# Patient Record
Sex: MALE | Race: BLACK OR AFRICAN AMERICAN | Employment: UNEMPLOYED | URBAN - METROPOLITAN AREA
[De-identification: names, ages, dates, MRNs, and addresses within clinical notes are randomized per-mention and may not be internally consistent; named-entity substitution may affect disease eponyms.]

---

## 2018-09-18 ENCOUNTER — HOSPITAL ENCOUNTER (EMERGENCY)
Age: 33
Discharge: HOME | End: 2018-09-18
Payer: SELF-PAY

## 2018-09-18 VITALS
HEART RATE: 98 BPM | WEIGHT: 180 LBS | HEIGHT: 73 IN | BODY MASS INDEX: 23.86 KG/M2 | SYSTOLIC BLOOD PRESSURE: 154 MMHG | OXYGEN SATURATION: 100 % | RESPIRATION RATE: 16 BRPM | TEMPERATURE: 97.7 F | DIASTOLIC BLOOD PRESSURE: 86 MMHG

## 2018-09-18 DIAGNOSIS — J06.9 VIRAL URI: Primary | ICD-10-CM

## 2018-09-18 LAB — DEPRECATED S PYO AG THROAT QL EIA: NEGATIVE

## 2018-09-18 PROCEDURE — 99283 EMERGENCY DEPT VISIT LOW MDM: CPT

## 2018-09-18 PROCEDURE — 87880 STREP A ASSAY W/OPTIC: CPT

## 2018-09-18 PROCEDURE — 87081 CULTURE SCREEN ONLY: CPT

## 2018-09-18 RX ORDER — PREDNISONE 10 MG/1
TABLET ORAL
Qty: 21 TAB | Refills: 0 | Status: SHIPPED | OUTPATIENT
Start: 2018-09-18

## 2018-09-18 NOTE — ED PROVIDER NOTES
HPI Comments: 72-year-old male complaining of sore throat and dry tongue. His symptoms started this morning about 3 hours ago. Patient ate breakfast without difficulty and came to the ED to be checked. Patient is a 34 y.o. male presenting with sore throat. The history is provided by the patient. Sore Throat This is a new problem. There has been no fever. Pertinent negatives include no diarrhea, no vomiting and no congestion. He has tried nothing for the symptoms. History reviewed. No pertinent past medical history. History reviewed. No pertinent surgical history. History reviewed. No pertinent family history. Social History Social History  Marital status: SINGLE Spouse name: N/A  
 Number of children: N/A  
 Years of education: N/A Occupational History  Not on file. Social History Main Topics  Smoking status: Not on file  Smokeless tobacco: Not on file  Alcohol use Not on file  Drug use: Not on file  Sexual activity: Not on file Other Topics Concern  Not on file Social History Narrative  No narrative on file ALLERGIES: Review of patient's allergies indicates no known allergies. Review of Systems Constitutional: Negative. Negative for activity change. HENT: Positive for sore throat. Negative for congestion. Eyes: Negative. Respiratory: Negative. Cardiovascular: Negative. Gastrointestinal: Negative. Negative for diarrhea and vomiting. Genitourinary: Negative. Musculoskeletal: Negative. Skin: Negative. Neurological: Negative. Psychiatric/Behavioral: Negative. All other systems reviewed and are negative. Vitals:  
 09/18/18 0759 BP: (!) 148/91 Pulse: (!) 106 Resp: 16 Temp: 97.7 °F (36.5 °C) SpO2: 96% Weight: 81.6 kg (180 lb) Height: 6' 1\" (1.854 m) Physical Exam  
Constitutional: He is oriented to person, place, and time.  He appears well-developed and well-nourished. No distress. HENT:  
Head: Normocephalic and atraumatic. Right Ear: External ear normal.  
Left Ear: External ear normal.  
Nose: Nose normal.  
Mouth/Throat: Oropharynx is clear and moist. No oropharyngeal exudate. Eyes: Conjunctivae and EOM are normal. Pupils are equal, round, and reactive to light. Right eye exhibits no discharge. Left eye exhibits no discharge. No scleral icterus. Neck: Normal range of motion. Neck supple. No JVD present. No tracheal deviation present. Cardiovascular: Regular rhythm and intact distal pulses. Tachycardia present. Pulmonary/Chest: Effort normal and breath sounds normal. No stridor. No respiratory distress. He has no wheezes. He exhibits no tenderness. Abdominal: Soft. Bowel sounds are normal. He exhibits no distension and no mass. There is no tenderness. Musculoskeletal: Normal range of motion. He exhibits no edema or tenderness. Neurological: He is alert and oriented to person, place, and time. No cranial nerve deficit. Skin: Skin is warm and dry. No rash noted. He is not diaphoretic. No erythema. No pallor. Psychiatric: He has a normal mood and affect. His behavior is normal. Thought content normal.  
Nursing note and vitals reviewed. MDM Number of Diagnoses or Management Options Diagnosis management comments: Assessment viral illness Plan prednisone followed closely PCP Amount and/or Complexity of Data Reviewed Clinical lab tests: ordered and reviewed Tests in the medicine section of CPT®: ordered and reviewed ED Course Procedures

## 2018-09-18 NOTE — DISCHARGE INSTRUCTIONS

## 2018-09-18 NOTE — ED NOTES
I have reviewed discharge instructions with the patient. The patient verbalized understanding. Patient left ED via Discharge Method: ambulatory to Home with girlfriend. Opportunity for questions and clarification provided. Patient given 1 scripts. Prednisone. Instructed to take OTC tylenol/motrin for fever/pain and to push fluids. To continue your aftercare when you leave the hospital, you may receive an automated call from our care team to check in on how you are doing. This is a free service and part of our promise to provide the best care and service to meet your aftercare needs.  If you have questions, or wish to unsubscribe from this service please call [phone]. Thank you for Choosing our [facility] Emergency Department.

## 2018-09-18 NOTE — ED TRIAGE NOTES
Patient c/o dry mouth and sore throat since this morning. Denies cough, congestion, sob, abdominal pain.

## 2018-09-21 LAB
BACTERIA SPEC CULT: NORMAL
SERVICE CMNT-IMP: NORMAL

## 2018-10-02 ENCOUNTER — HOSPITAL ENCOUNTER (EMERGENCY)
Age: 33
Discharge: HOME | End: 2018-10-02
Attending: EMERGENCY MEDICINE
Payer: SELF-PAY

## 2018-10-02 ENCOUNTER — APPOINTMENT (OUTPATIENT)
Dept: GENERAL RADIOLOGY | Age: 33
End: 2018-10-02
Attending: EMERGENCY MEDICINE
Payer: SELF-PAY

## 2018-10-02 VITALS
RESPIRATION RATE: 18 BRPM | BODY MASS INDEX: 23.19 KG/M2 | HEIGHT: 73 IN | OXYGEN SATURATION: 100 % | WEIGHT: 175 LBS | HEART RATE: 86 BPM | TEMPERATURE: 98.4 F

## 2018-10-02 DIAGNOSIS — M79.645 FINGER PAIN, LEFT: Primary | ICD-10-CM

## 2018-10-02 PROCEDURE — 99282 EMERGENCY DEPT VISIT SF MDM: CPT | Performed by: EMERGENCY MEDICINE

## 2018-10-02 PROCEDURE — 73140 X-RAY EXAM OF FINGER(S): CPT

## 2018-10-02 NOTE — DISCHARGE INSTRUCTIONS
Hand Pain: Care Instructions  Your Care Instructions    Common causes of hand pain are overuse and injuries, such as might happen during sports or home repair projects. Everyday wear and tear, especially as you get older, also can cause hand pain. Most minor hand injuries will heal on their own, and home treatment is usually all you need to do. If you have sudden and severe pain, you may need tests and treatment. Follow-up care is a key part of your treatment and safety. Be sure to make and go to all appointments, and call your doctor if you are having problems. It's also a good idea to know your test results and keep a list of the medicines you take. How can you care for yourself at home? · Take pain medicines exactly as directed. ¨ If the doctor gave you a prescription medicine for pain, take it as prescribed. ¨ If you are not taking a prescription pain medicine, ask your doctor if you can take an over-the-counter medicine. · Rest and protect your hand. Take a break from any activity that may cause pain. · Put ice or a cold pack on your hand for 10 to 20 minutes at a time. Put a thin cloth between the ice and your skin. · Prop up the sore hand on a pillow when you ice it or anytime you sit or lie down during the next 3 days. Try to keep it above the level of your heart. This will help reduce swelling. · If your doctor recommends a sling, splint, or elastic bandage to support your hand, wear it as directed. When should you call for help? Call 911 anytime you think you may need emergency care. For example, call if:    · Your hand turns cool or pale or changes color.    Call your doctor now or seek immediate medical care if:    · You cannot move your hand.     · Your hand pops, moves out of its normal position, and then returns to its normal position.     · You have signs of infection, such as:  ¨ Increased pain, swelling, warmth, or redness. ¨ Red streaks leading from the sore area.   ¨ Pus draining from a place on your hand. ¨ A fever.     · Your hand feels numb or tingly.    Watch closely for changes in your health, and be sure to contact your doctor if:    · Your hand feels unstable when you try to use it.     · You do not get better as expected.     · You have any new symptoms, such as swelling.     · Bruises from an injury to your hand last longer than 2 weeks. Where can you learn more? Go to http://natasha-mayte.info/. Enter R273 in the search box to learn more about \"Hand Pain: Care Instructions. \"  Current as of: November 20, 2017  Content Version: 11.7  © 6387-2370 [employer]. Care instructions adapted under license by [employer] (which disclaims liability or warranty for this information). If you have questions about a medical condition or this instruction, always ask your healthcare professional. [address] any warranty or liability for your use of this information.

## 2018-10-02 NOTE — ED PROVIDER NOTES
HPI Comments: Patient was wrestling his roommate and injured his left long finger. Has pain and swelling. This occurred 4 hours prior to arrival.  Denies any other injury. Patient is a 34 y.o. male presenting with finger pain. The history is provided by the patient. No  was used. Finger Pain This is a new problem. The current episode started 3 to 5 hours ago. The problem occurs constantly. The problem has not changed since onset. The pain is present in the left fingers. The quality of the pain is described as sharp. The pain is moderate. Associated symptoms include limited range of motion. Pertinent negatives include no numbness, no back pain and no neck pain. The symptoms are aggravated by movement and palpation. He has tried nothing for the symptoms. There has been a history of trauma. No past medical history on file. No past surgical history on file. No family history on file. Social History Social History  Marital status: SINGLE Spouse name: N/A  
 Number of children: N/A  
 Years of education: N/A Occupational History  Not on file. Social History Main Topics  Smoking status: Not on file  Smokeless tobacco: Not on file  Alcohol use Not on file  Drug use: Not on file  Sexual activity: Not on file Other Topics Concern  Not on file Social History Narrative  No narrative on file ALLERGIES: Review of patient's allergies indicates no known allergies. Review of Systems Constitutional: Negative for chills and fever. Eyes: Negative for pain and redness. Respiratory: Negative for chest tightness, shortness of breath and wheezing. Cardiovascular: Negative for chest pain and leg swelling. Gastrointestinal: Negative for abdominal pain, diarrhea, nausea and vomiting. Musculoskeletal: Positive for arthralgias and joint swelling. Negative for back pain, gait problem, neck pain and neck stiffness. Skin: Negative for color change, rash and wound. Neurological: Negative for weakness, numbness and headaches. Vitals:  
 10/02/18 0557 Pulse: 86 Resp: 18 Temp: 98.4 °F (36.9 °C) SpO2: 100% Weight: 79.4 kg (175 lb) Height: 6' 1\" (1.854 m) Physical Exam  
Constitutional: He is oriented to person, place, and time. He appears well-developed and well-nourished. HENT:  
Head: Normocephalic and atraumatic. Cardiovascular: Normal rate and regular rhythm. No murmur heard. Pulmonary/Chest: Effort normal and breath sounds normal. He has no wheezes. Abdominal: Soft. Bowel sounds are normal. There is no tenderness. Musculoskeletal: He exhibits tenderness (left long finger TTP with LROM> distal cap refill less than 2 secs. sensation nitact. ). He exhibits no edema. Neurological: He is alert and oriented to person, place, and time. Skin: Skin is warm and dry. Nursing note and vitals reviewed. MDM Number of Diagnoses or Management Options Diagnosis management comments: No finger fracture will discharge. Amount and/or Complexity of Data Reviewed Tests in the radiology section of CPT®: ordered and reviewed Patient Progress Patient progress: stable ED Course Procedures XR 3RD FINGER LT MIN 2 V (Final result) Result time: 10/02/18 06:18:17  
  Final result by Jessica Hylton MD (10/02/18 06:18:17)  
  Impression:  
  IMPRESSION:  Soft tissue swelling, with no acute fracture.  
  Narrative:  
  EXAM:  Left third finger x-rays. DATEDarnella Render 2, 2018. INDICATION:  Pain, injury. COMPARISON:  None. TECHNIQUE:  3 views of the left third finger were obtained. FINDINGS: Percy Akbar is soft tissue swelling adjacent to the proximal 
interphalangeal joint.  The bony structures are normal. No fracture or 
dislocation is identified.

## 2018-10-02 NOTE — ED NOTES
I have reviewed discharge instructions with the patient. The patient verbalized understanding. Patient left ED via Discharge Method: ambulatory to Home with (friend, self). Opportunity for questions and clarification provided. Patient given 0 scripts. To continue your aftercare when you leave the hospital, you may receive an automated call from our care team to check in on how you are doing. This is a free service and part of our promise to provide the best care and service to meet your aftercare needs.  If you have questions, or wish to unsubscribe from this service please call [phone]. Thank you for Choosing our [facility] Emergency Department.